# Patient Record
Sex: MALE | Race: WHITE | NOT HISPANIC OR LATINO | Employment: UNEMPLOYED | URBAN - METROPOLITAN AREA
[De-identification: names, ages, dates, MRNs, and addresses within clinical notes are randomized per-mention and may not be internally consistent; named-entity substitution may affect disease eponyms.]

---

## 2023-05-25 ENCOUNTER — HOSPITAL ENCOUNTER (OUTPATIENT)
Dept: DATA CONVERSION | Facility: HOSPITAL | Age: 73
End: 2023-05-25
Attending: SPECIALIST | Admitting: SPECIALIST
Payer: MEDICARE

## 2023-05-25 DIAGNOSIS — Z45.010 ENCOUNTER FOR CHECKING AND TESTING OF CARDIAC PACEMAKER PULSE GENERATOR (BATTERY): ICD-10-CM

## 2023-05-25 DIAGNOSIS — I44.2 ATRIOVENTRICULAR BLOCK, COMPLETE (MULTI): ICD-10-CM

## 2023-05-25 DIAGNOSIS — I44.1 ATRIOVENTRICULAR BLOCK, SECOND DEGREE: ICD-10-CM

## 2023-09-30 NOTE — H&P
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  He tells me he had his original pacemaker placed in 2006 for AV block.  His pacemaker was changed in 2012.  His last follow-up with Dr. Hernandez had been in 2019 and then  he missed some appointments due to his health.  He recently scheduled an appointment due to impending travel which revealed his pacemaker battery was extremely low and he needed it changed immediately, therefore he had work-up completed and was scheduled  for pacemaker change over today.  He has been n.p.o. since 8 PM last night.  He is not on anticoagulation.  He denies recent syncope, chest pain, fever or chills.  He has chronic dyspnea at exertion that is at baseline.  He denies rash or open sores.   He denies history of MRSA.     PMHX: HTN, HLD, CAD, GERD, prostate cancer with radiation, lung nodules under surveillance, OA, rotator cuff tear (right)  PSHX: PCI stent,PPMR 2006, 2012, left cataract surgery, cholecystectomy, ERCP, laminectomy, appendectomy, vasectomy  Social HX: Ex-smoker quit 1995, does not drink alcohol since 2018  FHX: CAD, prostate cancer      Comorbidities:   Comorbidites:  ·  Comorbid Conditions hypertension     Family History:   Cancer: yes    CAD: yes      Social History:   Social History:  Smoking Status former smoker    Alcohol Use denies              Allergies:  ·  No Known Allergies :     Medications Prior to Admission:   Admission Medication Reconciliation has not been completed for this patient.    Review of Systems:   Constitutional: NEGATIVE: Fever, Chills, Anorexia,  Malaise     Eyes: NEGATIVE: Blurry Vision, Diploplia, Vision  Loss/ Change     ENMT: NEGATIVE: Nasal Discharge, Nasal Congestion,   Throat Pain     Respiratory: NEGATIVE: Dry Cough, Productive Cough,  Wheezing, Shortness of Breath     Cardiac: POSITIVE: Dyspnea on Exertion; NEGATIVE:  Chest Pain, Palpitations, Syncope; COMMENTS: chronic FRANKEL     Gastrointestinal: NEGATIVE: Nausea, Vomiting, Diarrhea,  Constipation, Abdominal Pain     Genitourinary: NEGATIVE: Dysuria, Flank Pain, Hematuria     Musculoskeletal: NEGATIVE: Pain, Swelling, Weakness     Neurological: NEGATIVE: Dizziness, Headache, Syncope     Psychiatric: NEGATIVE: Anxiety     Skin: NEGATIVE: Rash, Ulcer       Objective:     Objective Information:    Temperature 98.0, heart rate 78, respiratory rate 12, pulse ox 98% room air, blood pressure 152/85      Physical Exam by System:    Constitutional: Well developed, awake/alert/oriented  x3, no distress, alert and cooperative   Eyes: PERRL, EOMI, clear sclera   ENMT: mucous membranes moist, no apparent injury,  no lesions seen   Head/Neck: Neck supple, no apparent injury, trachea  midline   Respiratory/Thorax: Patent airways, CTAB, normal  breath sounds with good chest expansion, thorax symmetric, able to speak in complete sentences.   Cardiovascular: RRR no murmur, +1 bilateral lower  extremity nonpitting edema, pulses palpable   Gastrointestinal: Nondistended, soft, non-tender,  no rebound tenderness or guarding, +BS,   Musculoskeletal: ROM intact, no joint swelling, normal  strength   Neurological: alert and oriented x3, speech clear,  smile symmetrical, moving all extremity, no elicited deficits   Psychological: Appropriate mood and behavior   Skin: Warm and dry, no lesions, no rashes, healed  scar left chest previous pacemaker insertion site, +1 bilateral lower extremity nonpitting edema     Medications:    Medications:          Continuous Medications       --------------------------------    1. Sodium Chloride 0.9% Infusion:  1000  mL  IntraVenous  <Continuous>         Scheduled Medications       --------------------------------  No  scheduled medications are active         PRN Medications       --------------------------------    1. 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  He tells me he had his original pacemaker placed in 2006 for AV block.  His pacemaker was changed in 2012.  His last follow-up with Dr. Hernandez had been in 2019 and then  he missed some appointments due to his health.  He recently scheduled an appointment due to impending travel which revealed his pacemaker battery was extremely low and he needed it changed immediately, therefore he had work-up completed and was scheduled  for pacemaker change over today.  He has been n.p.o. since 8 PM last night.  He is not on anticoagulation.  He denies recent syncope, chest pain, fever or chills.  He has chronic dyspnea at exertion that is at baseline.  He denies rash or open sores.   He denies history of MRSA.     Further clearance for procedure and orders deferred to Dr Hernandez.       Plan of Care Reviewed With:  Plan of Care Reviewed With: patient; son       Electronic Signatures:  Ana Harry (APRN-CNP)  (Signed 25-May-2023 13:25)   Authored: History of Present Illness, Comorbidities,  Family History, Social History, Allergies, Medications Prior to Admission, Review of Systems, Objective, Assessment and Plan, Note Completion      Last Updated: 25-May-2023 13:25 by Ana Harry (APRN-CNP)

## 2024-02-08 ENCOUNTER — EVALUATION (OUTPATIENT)
Dept: PHYSICAL THERAPY | Facility: CLINIC | Age: 74
End: 2024-02-08
Payer: MEDICARE

## 2024-02-08 DIAGNOSIS — Z96.641 PRESENCE OF RIGHT ARTIFICIAL HIP JOINT: ICD-10-CM

## 2024-02-08 DIAGNOSIS — M16.11 UNILATERAL PRIMARY OSTEOARTHRITIS, RIGHT HIP: ICD-10-CM

## 2024-02-08 PROCEDURE — 97110 THERAPEUTIC EXERCISES: CPT | Mod: GP | Performed by: PHYSICAL THERAPIST

## 2024-02-08 PROCEDURE — 97161 PT EVAL LOW COMPLEX 20 MIN: CPT | Mod: GP | Performed by: PHYSICAL THERAPIST

## 2024-02-08 PROCEDURE — 97535 SELF CARE MNGMENT TRAINING: CPT | Mod: GP | Performed by: PHYSICAL THERAPIST

## 2024-02-08 ASSESSMENT — PAIN - FUNCTIONAL ASSESSMENT: PAIN_FUNCTIONAL_ASSESSMENT: 0-10

## 2024-02-08 ASSESSMENT — PAIN SCALES - GENERAL: PAINLEVEL_OUTOF10: 0 - NO PAIN

## 2024-02-08 NOTE — LETTER
2024    Nam Patel MD  [address] [facility] [address]  [place] [address]    Patient: William Olsen   YOB: 1950   Date of Visit: 2024       Dear Nam Patel MD  [address]  [place] [address]    The attached plan of care is being sent to you because your patient’s medical reimbursement requires that you certify the plan of care. Your signature is required to allow uninterrupted insurance coverage.      You may indicate your approval by signing below and faxing this form back to us at Dept Fax: [phone].    Please call Dept: [phone] with any questions or concerns.    Thank you for this referral,        Laz Moseley, PT  [place] [address] [facility]  [address] [facility] [address]    Payer: Payor: MEDICARE / Plan: MEDICARE PART A AND B / Product Type: *No Product type* /                                                                         Date:     Dear Laz Moseley, PT,     Re: Mr. William Olsen, MRN:[id number]    I certify that I have reviewed the attached plan of care and it is medically necessary for Mr. William Olsen (1950) who is under my care.          ______________________________________                    _________________  Provider name and credentials                                           Date and time                                                                                           Plan of Care 24   Effective from: 2024  Effective to: 2024    Plan ID: [id number]            Participants as of Finalize on 2024    Name Type Comments Contact Info    Nam Patel MD Referring Provider  [phone]    Laz Moseley, PT Physical Therapist  [phone]       Last Plan Note     Author: Laz Moseley PT Status: Incomplete Last edited: 2024 10:45 AM       PT Initial Evaluation    Patient Name:  William Olsen    MRN:  [id number]    :   1950    Today's Date:  24    Time Calculation  Start Time: 1040  Stop Time: 1125  Time Calculation (min): 45 min    PT Evaluation Time Entry  PT Evaluation (Low) Time Entry: 15  PT Therapeutic Procedures Time Entry  Therapeutic Exercise Time Entry: 15  Self-Care/Home Mgmt Trainin     Informed Consent  Patient has been informed of all evaluation findings and treatment plans and agrees to participate in Physical Therapy services and plans as outlined.    Diagnosis:  Diagnosis and Precautions: R hip OA, s/p R ASH 2024 Dr. Nam Patel    Goals:   By the end of 10 visits patient will be able to do the following with < 1/10 R HIP pain:    HEP:  Patient will consistently perform HIS home exercise program for 20-30 minute sessions, 1-2x/day, 3-4 days/week independently by the end of 10 visits.    Basic ADL's:   Patient will perform bADL's/instrumental ADL's for 30 minutes moving between various closed kinetic chain postures.    ROM and Strength:  Patient will demonstrate 5/5 R HIP strength in all planes and WNL's R HIP AROM in all planes to improve their ability to lift, stand, ambulate and perform basic ADL's.    Stair Negotiation:  Patient will be able to ambulate up/down stairs for 1-2 flights at a time.    Gait/Locomotion:  Patient will be able to ambulate for 30-60 minutes at a time.  Minimal to no gait deviation across level ground/stairs.  Patient will demonstrate no R HIP pain with the following movements:  partial squat, lunge, forward/lateral step-up, HR, stepdown and SLS.    Sleep:  Patient will sleep thru the night 4/7 nights/week.    Participation restrictions:  Increase LEFS to > or = to 55/80 for increased functional ability.    Pain:  Decrease pain at worst to < or = to 1/10 for improved QOL and ability to sleep.    No point tenderness noted over the R hip.     Plan of Care:      Treatment/Interventions: Cryotherapy, Education/ Instruction, Gait training, Manual therapy, Neuromuscular  re-education, Self care/ home management, Taping techniques, Therapeutic activities, Therapeutic exercises  PT Plan: Skilled PT  PT Frequency: Other (Comment) (1-2x/week)  Duration: 10 visits  Onset Date: 01/20/24  Certification Period Start Date: 02/08/24  Certification Period End Date: 05/08/24  Number of Treatments Authorized: 10  Rehab Potential: Good  Plan of Care Agreement: Patient    PT Assessment:    Patient is a 73 y.o. MALE with c/o R hip pain.   Patient is alert and oriented x 3.  Patient presents with medical diagnosis of R hip OA, ~ 3 weeks s/p R ASH 1/20/2024 Dr. Nam Patel  contributing to compensatory soft tissue dysfunction, pain, stiffness and weakness of the R hip.   Significant past medical history/past surgical history includes see above.    Skilled care is needed to progress the patient back to these activities without exacerbating symptoms.   Patient requires skilled PT services to address the problems identified and the individualized patient's goals as outlined in the problems and goals section of this evaluation.  A skilled PT is required to address these key impairments and to provide and progress with an appropriate home exercise program. Patient does have any significant PMH influencing Rx and reports motivation to return to FUNCTIONAL ACTIVITY.   Patient demonstrates to be a good candidate for physical therapy with good rehab potential and verbalized a good understanding of HIS diagnosis, prognosis and treatment.  Goals have been established and reviewed with the patient.      PT Assessment Results: Decreased strength, Decreased range of motion, Decreased endurance, Impaired balance, Decreased mobility, Orthopedic restrictions  Rehab Prognosis: Good  Evaluation/Treatment Tolerance: Patient tolerated treatment well    Complexity:  Low complexity evaluation  due to a 15 minute duration, a past medical history WITH any personal factors and/or comorbidities that could impact the POC,  examination of body systems completed on one to two elements, the patient presents with a stable condition, and clinical decision making using the LEFS was of low complexity.     Prognosis:  Rehab Prognosis: Good    Problem List  Activity Limitations, Decreased Functional Level, Decreased knowledge of HEP, Flexibility, Gait issues, Pain, Range of Motion/joint mobility issues, Strength, and Endurance    Impairments   IMPAIRED ROM LOWER BODY, IMPAIRED STRENGTH LOWER BODY, IMPAIRED GAIT, IMPAIRED BALANCE, IMPAIRED CORE STABILITY, INCREASED PAIN, IMPAIRED FUNCTIONAL ACTIVITY LEVEL, and IMPAIRED FUNCTIONAL MOVEMENT PATTERNS    Functional Limitations:  LIMITATIONS PERFORMING BASIC ADL'S, ISSUES WITH SLEEP, PARTICIPATION IN HOBBIES, PARTICIPATION IN LEISURE ACTIVITIES, and PARTICIPATION IN HOME MANAGEMENT    General Visit Information:  Reason for Referral: Evaluate and Treat  Referred By: Dr. Nam Patel  General Comment: R hip OA, s/p R ASH 1/20/2024 Dr. Nam Patel    Pre-Cautions:  GLOADI Fall Risk Score (The score of 4 or more indicates an increased risk of falling): 0  LE Weight Bearing Status: Weight Bearing as Tolerated  Medical Precautions:  (pacemaker, history of stents, heart surgery, back surgery, R hand surgery, gallbladder surgery, heart disease, HTN, prostate cancer and surgery, angina, sleep apnea, L ankle surgery, see attached sheets))  Post-Surgical Precautions: Right hip precautions (posterolateral approach)    Protocol:  Right hip precautions     Reason for Visit:  PT Evaluate and Treat    Initial Evaluation:  Referred By: Dr. Nam Patel    Insurance  Insurance reviewed  Name of Insurance:  Medicare  Visit No.  1  * (EVAL) RT HIP ; MEDICARE PART B 20% COINSUR 240 DED UNLIM V BMN  PA IS NOT REQ; AARP IS SEC TO MEDICARE AND WILL COV PART B COINSUR AFTER DED IS MET [id number] // Isabelle confirmed 2/1/24 10:49am     Subjective:    Current Episode  Date of Onset:  Chronic R hip pain for 2 years before  surgery  Mechanism of injury:  Arthritic changes of the R hip over the years, no specific mechanism of injury to the R hip.    Pain Score:  Pain Assessment: 0-10  Pain Assessment  Pain Assessment: 0-10  Pain Score: 0 - No pain  Pain Type: Surgical pain  Pain Location: Hip  Pain Orientation: Right  Pain Type: Surgical pain  Pain Location: Hip  Pain Orientation: Right     Better with:  meds, walking    Worse with:  prolonged sitting, stairs, doing laundry, using the bird feeder    Medical History/Surgical History:  Medical Precautions:  (pacemaker, history of stents, heart surgery, back surgery, R hand surgery, gallbladder surgery, heart disease, HTN, prostate cancer and surgery, angina, sleep apnea, L ankle surgery, see attached sheets)    Reviewed medical history form with patient (medications/allergies reviewed with patient).  No current outpatient medications    Functional Assessment:  Level of Luce:  Level of Luce: Independent with ADLs and functional transfers    Functional Limitations:  prolonged sitting, stairs, doing laundry, using the bird feeder    Work Status:  RETIRED  Patient Awareness:  Patient is aware of HIS diagnosis and prognosis.  Social Support/History:  LIVES ALONE    Objective:  Restrictions as per MD's Protocol if surgical:  Evaluate and Treat    Weightbearing Status:  WBAT R LE with cane    Any Pre-Cautions:  posterolateral hip pre-cautions    Skin:  R hip surgical incision healing and closed, no active sign of infection    Palpation:   no point tenderness over R hip    Sensation:  Patient denies numbness/tingling of bilateral LOWER extremities.    Gait:  mild antalgic gait R LE with cane assist    ROM:  AROM  R hip flexion 80 degrees, ER 15 degrees, abduction end range tightness  R knee AROM WNL's, L knee AROM WNL's, R ankle/foot AROM WNL's, and L ankle/foot AROM WNL's    Strength:    R hip flexion 3+/5, R hip abduction 3+/5, R knee 4/5 all planes  L knee 5/5 all planes, L hip  5/5 all planes, R ankle/foot 5/5 all planes, and L ankle/foot 5/5 all planes    Outcome measure  Lower Extremity Funtional Score (LEFS): 28/80      Treatment  Time in clinic started at  10:40am  Time in clinic ended at  11:25am  Total time in clinic is . 45 minutes  Total timed code time is  38 minutes    Treatment Performed Today:.   PT Initial Evaluation, Therapeutic Exercise, and Self-Care/Home Management HEP  Individual(s) Educated: Patient  Education Provided: Home Exercise Program  Diagnosis and Precautions: R hip OA, s/p R ASH 1/20/2024 Dr. Nam Patel  Risk and Benefits Discussed with Patient/Caregiver/Other: yes  Patient/Caregiver Demonstrated Understanding: yes  Plan of Care Discussed and Agreed Upon: yes  Patient Response to Education: Patient/Caregiver Verbalized Understanding of Information, Patient/Caregiver Performed Return Demonstration of Exercises/Activities    Patient instructed in a home exercise program, has been given handouts for each of the exercises performed and was given another sheet instructing patient in the amount of reps to perform and the dariana to follow while doing the exercises     Access Code: [email or web address]  URL: [email or web address]  Date: 02/08/2024  Prepared by: Laz Moseley    Exercises  - Supine Bridge  - 1 x daily - 3-4 x weekly - 2 sets - 10 reps - 5-10 hold  - Supine Straight Leg Raise  - 1 x daily - 3-4 x weekly - 2 sets - 10 reps - 5-10 hold  - Standing March with Counter Support  - 1 x daily - 3-4 x weekly - 2 sets - 10 reps - 5-10 hold  - Mini Squat with Counter Support  - 1 x daily - 3-4 x weekly - 2 sets - 10 reps - 5-10 hold         Current Participants as of 2/8/2024    Name Type Comments Contact Info    Nam Patel MD Referring Provider  [phone]    Signature pending    Laz Moseley PT Physical Therapist  [phone]           Abdominal Pain, N/V/D n/a

## 2024-02-08 NOTE — PROGRESS NOTES
PT Initial Evaluation    Patient Name:  William Olsen    MRN:  [id number]    :  1950    Today's Date:  24    Time Calculation  Start Time: 1040  Stop Time: 1125  Time Calculation (min): 45 min    PT Evaluation Time Entry  PT Evaluation (Low) Time Entry: 15  PT Therapeutic Procedures Time Entry  Therapeutic Exercise Time Entry: 15  Self-Care/Home Mgmt Trainin     Informed Consent  Patient has been informed of all evaluation findings and treatment plans and agrees to participate in Physical Therapy services and plans as outlined.    Diagnosis:  Diagnosis and Precautions: R hip OA, s/p R ASH 2023 Dr. Nam Patel    Goals:   By the end of 10 visits patient will be able to do the following with < 1/10 R HIP pain:    HEP:  Patient will consistently perform HIS home exercise program for 20-30 minute sessions, 1-2x/day, 3-4 days/week independently by the end of 10 visits.    Basic ADL's:   Patient will perform bADL's/instrumental ADL's for 30 minutes moving between various closed kinetic chain postures.    ROM and Strength:  Patient will demonstrate 5/5 R HIP strength in all planes and WNL's R HIP AROM in all planes to improve their ability to lift, stand, ambulate and perform basic ADL's.    Stair Negotiation:  Patient will be able to ambulate up/down stairs for 1-2 flights at a time.    Gait/Locomotion:  Patient will be able to ambulate for 30-60 minutes at a time.  Minimal to no gait deviation across level ground/stairs.  Patient will demonstrate no R HIP pain with the following movements:  partial squat, lunge, forward/lateral step-up, HR, stepdown and SLS.    Sleep:  Patient will sleep thru the night 4/7 nights/week.    Participation restrictions:  Increase LEFS to > or = to 55/80 for increased functional ability.    Pain:  Decrease pain at worst to < or = to 1/10 for improved QOL and ability to sleep.    No point tenderness noted over the R hip.     Plan of Care:      Treatment/Interventions:  Cryotherapy, Education/ Instruction, Gait training, Manual therapy, Neuromuscular re-education, Self care/ home management, Taping techniques, Therapeutic activities, Therapeutic exercises  PT Plan: Skilled PT  PT Frequency: Other (Comment) (1-2x/week)  Duration: 10 visits  Onset Date: 12/20/23  Certification Period Start Date: 02/08/24  Certification Period End Date: 05/08/24  Number of Treatments Authorized: 10  Rehab Potential: Good  Plan of Care Agreement: Patient    PT Assessment:    Patient is a 73 y.o. MALE with c/o R hip pain.   Patient is alert and oriented x 3.  Patient presents with medical diagnosis of R hip OA, s/p R ASH 12/20/2023 Dr. Nam Patel  contributing to compensatory soft tissue dysfunction, pain, stiffness and weakness of the R hip.   Significant past medical history/past surgical history includes see above.    Skilled care is needed to progress the patient back to these activities without exacerbating symptoms.   Patient requires skilled PT services to address the problems identified and the individualized patient's goals as outlined in the problems and goals section of this evaluation.  A skilled PT is required to address these key impairments and to provide and progress with an appropriate home exercise program. Patient does have any significant PMH influencing Rx and reports motivation to return to FUNCTIONAL ACTIVITY.   Patient demonstrates to be a good candidate for physical therapy with good rehab potential and verbalized a good understanding of HIS diagnosis, prognosis and treatment.  Goals have been established and reviewed with the patient.      PT Assessment Results: Decreased strength, Decreased range of motion, Decreased endurance, Impaired balance, Decreased mobility, Orthopedic restrictions  Rehab Prognosis: Good  Evaluation/Treatment Tolerance: Patient tolerated treatment well    Complexity:  Low complexity evaluation  due to a 15 minute duration, a past medical history WITH  any personal factors and/or comorbidities that could impact the POC, examination of body systems completed on one to two elements, the patient presents with a stable condition, and clinical decision making using the LEFS was of low complexity.     Prognosis:  Rehab Prognosis: Good    Problem List  Activity Limitations, Decreased Functional Level, Decreased knowledge of HEP, Flexibility, Gait issues, Pain, Range of Motion/joint mobility issues, Strength, and Endurance    Impairments   IMPAIRED ROM LOWER BODY, IMPAIRED STRENGTH LOWER BODY, IMPAIRED GAIT, IMPAIRED BALANCE, IMPAIRED CORE STABILITY, INCREASED PAIN, IMPAIRED FUNCTIONAL ACTIVITY LEVEL, and IMPAIRED FUNCTIONAL MOVEMENT PATTERNS    Functional Limitations:  LIMITATIONS PERFORMING BASIC ADL'S, ISSUES WITH SLEEP, PARTICIPATION IN HOBBIES, PARTICIPATION IN LEISURE ACTIVITIES, and PARTICIPATION IN HOME MANAGEMENT    General Visit Information:  Reason for Referral: Evaluate and Treat  Referred By: Dr. Nam Patel  General Comment: R hip OA, s/p R ASH 12/20/2023 Dr. Nam Patel    Pre-Cautions:  STEADI Fall Risk Score (The score of 4 or more indicates an increased risk of falling): 0  LE Weight Bearing Status: Weight Bearing as Tolerated  Medical Precautions:  (pacemaker, history of stents, heart surgery, back surgery, R hand surgery, gallbladder surgery, heart disease, HTN, prostate cancer and surgery, angina, sleep apnea, L ankle surgery, see attached sheets))  Post-Surgical Precautions: Right hip precautions (posterolateral approach)    Protocol:  Right hip precautions     Reason for Visit:  PT Evaluate and Treat    Initial Evaluation:  Referred By: Dr. Nam Patel    Insurance  Insurance reviewed  Name of Insurance:  Medicare  Visit No.  1  * (EVAL) RT HIP ; MEDICARE PART B 20% COINSUR 240 DED UNLIM V BMN  PA IS NOT REQ; AARP IS SEC TO MEDICARE AND WILL COV PART B COINSUR AFTER DED IS MET [id number] // Isabelle confirmed 2/1/24 10:49am      Subjective:    Current Episode  Date of Onset:  Chronic R hip pain for 2 years before surgery  Mechanism of injury:  Arthritic changes of the R hip over the years, no specific mechanism of injury to the R hip.    Pain Score:  Pain Assessment: 0-10  Pain Assessment  Pain Assessment: 0-10  Pain Score: 0 - No pain  Pain Type: Surgical pain  Pain Location: Hip  Pain Orientation: Right  Pain Type: Surgical pain  Pain Location: Hip  Pain Orientation: Right     Better with:  meds, walking    Worse with:  prolonged sitting, stairs, doing laundry, using the bird feeder    Medical History/Surgical History:  Medical Precautions:  (pacemaker, history of stents, heart surgery, back surgery, R hand surgery, gallbladder surgery, heart disease, HTN, prostate cancer and surgery, angina, sleep apnea, L ankle surgery, see attached sheets)    Reviewed medical history form with patient (medications/allergies reviewed with patient).  No current outpatient medications    Functional Assessment:  Level of [place]:  Level of [place]: Independent with ADLs and functional transfers    Functional Limitations:  prolonged sitting, stairs, doing laundry, using the bird feeder    Work Status:  RETIRED  Patient Awareness:  Patient is aware of HIS diagnosis and prognosis.  Social Support/History:  LIVES ALONE    Objective:  Restrictions as per MD's Protocol if surgical:  Evaluate and Treat    Weightbearing Status:  WBAT R LE with cane    Any Pre-Cautions:  posterolateral hip pre-cautions    Skin:  R hip surgical incision healing and closed, no active sign of infection    Palpation:   no point tenderness over R hip    Sensation:  Patient denies numbness/tingling of bilateral LOWER extremities.    Gait:  mild antalgic gait R LE with cane assist    ROM:  AROM  R hip flexion 80 degrees, ER 15 degrees, abduction end range tightness  R knee AROM WNL's, L knee AROM WNL's, R ankle/foot AROM WNL's, and L ankle/foot AROM WNL's    Strength:    R  hip flexion 3+/5, R hip abduction 3+/5, R knee 4/5 all planes  L knee 5/5 all planes, L hip 5/5 all planes, R ankle/foot 5/5 all planes, and L ankle/foot 5/5 all planes    Outcome measure  Lower Extremity Funtional Score (LEFS): 28/80      Treatment  Time in clinic started at  10:40am  Time in clinic ended at  11:25am  Total time in clinic is . 45 minutes  Total timed code time is  38 minutes    Treatment Performed Today:.   PT Initial Evaluation, Therapeutic Exercise, and Self-Care/Home Management HEP  Individual(s) Educated: Patient  Education Provided: Home Exercise Program  Diagnosis and Precautions: R hip OA, s/p R ASH 12/20/2023 Dr. Nam Patel  Risk and Benefits Discussed with Patient/Caregiver/Other: yes  Patient/Caregiver Demonstrated Understanding: yes  Plan of Care Discussed and Agreed Upon: yes  Patient Response to Education: Patient/Caregiver Verbalized Understanding of Information, Patient/Caregiver Performed Return Demonstration of Exercises/Activities    Patient instructed in a home exercise program, has been given handouts for each of the exercises performed and was given another sheet instructing patient in the amount of reps to perform and the dariana to follow while doing the exercises     Access Code: [email or web address]  URL: [email or web address]  Date: 02/08/2024  Prepared by: Laz Moseley    Exercises  - Supine Bridge  - 1 x daily - 3-4 x weekly - 2 sets - 10 reps - 5-10 hold  - Supine Straight Leg Raise  - 1 x daily - 3-4 x weekly - 2 sets - 10 reps - 5-10 hold  - Standing March with Counter Support  - 1 x daily - 3-4 x weekly - 2 sets - 10 reps - 5-10 hold  - Mini Squat with Counter Support  - 1 x daily - 3-4 x weekly - 2 sets - 10 reps - 5-10 hold

## 2024-02-08 NOTE — PATIENT INSTRUCTIONS
Access Code: [email or web address]  URL: [email or web address]  Date: 02/08/2024  Prepared by: Laz Moseley    Exercises  - Supine Bridge  - 1 x daily - 3-4 x weekly - 2 sets - 10 reps - 5-10 hold  - Supine Straight Leg Raise  - 1 x daily - 3-4 x weekly - 2 sets - 10 reps - 5-10 hold  - Standing March with Counter Support  - 1 x daily - 3-4 x weekly - 2 sets - 10 reps - 5-10 hold  - Mini Squat with Counter Support  - 1 x daily - 3-4 x weekly - 2 sets - 10 reps - 5-10 hold

## 2024-02-15 ENCOUNTER — TREATMENT (OUTPATIENT)
Dept: PHYSICAL THERAPY | Facility: CLINIC | Age: 74
End: 2024-02-15
Payer: MEDICARE

## 2024-02-15 DIAGNOSIS — M16.11 PRIMARY OSTEOARTHRITIS OF RIGHT HIP: Primary | ICD-10-CM

## 2024-02-15 DIAGNOSIS — M16.11 UNILATERAL PRIMARY OSTEOARTHRITIS, RIGHT HIP: ICD-10-CM

## 2024-02-15 PROCEDURE — 97110 THERAPEUTIC EXERCISES: CPT | Mod: GP | Performed by: PHYSICAL THERAPIST

## 2024-02-15 ASSESSMENT — PAIN SCALES - GENERAL: PAINLEVEL_OUTOF10: 0 - NO PAIN

## 2024-02-15 ASSESSMENT — PAIN - FUNCTIONAL ASSESSMENT: PAIN_FUNCTIONAL_ASSESSMENT: 0-10

## 2024-02-15 NOTE — PROGRESS NOTES
PHYSICAL THERAPY TREATMENT    Patient name:  William Olsen    MRN:  [id number]    :  1950    Today's Date:  24    Time Calculation  Start Time: 831  Stop Time: 913  Time Calculation (min): 42 min     PT Therapeutic Procedures Time Entry  Therapeutic Exercise Time Entry: 38     Referral by:  Referred By: Dr. Nam Patel    Diagnoses:  Diagnosis and Precautions: R hip OA, s/p R ASH 2023 Dr. Nam Patel    Assessment/Plan:  Therapeutic exercise performed in order to improve R hip strength/ROM/mobility.  Patient requires increased tactile/verbal cues with exercise in order to reduce R hip stress/strain during the particular exercise performed.  Patient challenged with exercises performed in clinic secondary to R hip fatigue.   PT Assessment  PT Assessment Results: Decreased strength, Decreased range of motion, Decreased endurance, Impaired balance, Decreased mobility, Orthopedic restrictions  Rehab Prognosis: Good  Evaluation/Treatment Tolerance: Patient tolerated treatment well  OP PT Plan  PT Plan: Skilled PT  Rehab Potential: Good  Plan of Care Agreement: Patient    General Visit Information  PT  Visit  PT Received On: 02/15/24    General  Reason for Referral: Evaluate and Treat  Referred By: Dr. Nam Patel  General Comment: R hip OA, s/p R ASH 2023 Dr. Nam Patel    Insurance Reviewed  Name of insurance:  Medicare  Visit #:   2  RT HIP ; MEDICARE PART B 20% COINSUR 240 DED UNLIM V BMN // Isabelle confirmed 24 9:19am  PA IS NOT REQ; AARP IS SEC TO MEDICARE AND WILL COV PART B COINSUR AFTER DED IS MET [id number]     Subjective:  Patient reports that his R hip continues to feel better.    Precautions  STEADI Fall Risk Score (The score of 4 or more indicates an increased risk of falling): 0  LE Weight Bearing Status: Weight Bearing as Tolerated  Medical Precautions:  (pacemaker, history of stents, heart surgery, back surgery, R hand surgery, gallbladder surgery, heart disease, HTN,  "prostate cancer and surgery, angina, sleep apnea, L ankle surgery, see attached sheets)  Post-Surgical Precautions: Right hip precautions (posterolateral approach)    Pain:  Pain Assessment  Pain Assessment: 0-10  Pain Score: 0 - No pain  Pain Type: Surgical pain  Pain Location: Hip  Pain Orientation: Right    Treatments    Therapeutic Exercise  Therapeutic Exercise Performed: Yes  Therapeutic Exercise Activity 1: Recumbent bike x 8 minues, resistance 4.0  Therapeutic Exercise Activity 2: Standing R hip flexor stretch at steps, 30\"x5  Therapeutic Exercise Activity 3: Standing marching with TB red, 2 sets, 10 reps  Therapeutic Exercise Activity 4: Mini-Squats with TB around knees, blue TB, 3 sets, 10 reps  Therapeutic Exercise Activity 5: Forward Step-Ups 6 inch step, x 30 reps  Therapeutic Exercise Activity 6: Lateral Step-Ups 6 inch step, x 30 reps  Therapeutic Exercise Activity 7: Hip PRE's hip flexion, hip extension, hip abduction x 30 reps each R and L  Therapeutic Exercise Activity 8: supine bridge with hip abduction with blue TB, 2 sets, 10 reps  Therapeutic Exercise Activity 9: side lying clamshells blue TB x 20 reps    Treatment  Time in clinic started at:   8:31am  Time in clinic ended at:   9:13am  Total time in clinic is:   42 minutes  Total timed code time is:  38 minutes    Treatment Performed Today:.   Therapeutic Exercise x 3 units  "

## 2024-02-27 ENCOUNTER — TREATMENT (OUTPATIENT)
Dept: PHYSICAL THERAPY | Facility: CLINIC | Age: 74
End: 2024-02-27
Payer: MEDICARE

## 2024-02-27 DIAGNOSIS — M16.11 UNILATERAL PRIMARY OSTEOARTHRITIS, RIGHT HIP: ICD-10-CM

## 2024-02-27 PROCEDURE — 97110 THERAPEUTIC EXERCISES: CPT | Mod: GP,CQ

## 2024-02-27 ASSESSMENT — PAIN - FUNCTIONAL ASSESSMENT: PAIN_FUNCTIONAL_ASSESSMENT: 0-10

## 2024-02-27 ASSESSMENT — PAIN SCALES - GENERAL: PAINLEVEL_OUTOF10: 0 - NO PAIN

## 2024-02-27 NOTE — PROGRESS NOTES
PHYSICAL THERAPY TREATMENT    Patient name:  William Olsen    MRN:  [id number]    :  1950    Today's Date:  24    Time Calculation  Start Time: 1015  Stop Time: 1100  Time Calculation (min): 45 min     PT Therapeutic Procedures Time Entry  Therapeutic Exercise Time Entry: 45     Referral by:  Referred By: Dr. Nam Patel    Diagnoses:  Diagnosis and Precautions: R hip OA, s/p R ASH 2023 Dr. Nam Patel    Assessment/Plan:  Therapeutic exercise performed in order to improve R hip strength/ROM/mobility.  Patient requires increased tactile/verbal cues with exercise in order to reduce R hip stress/strain during the particular exercise performed.  Patient challenged with exercises performed in clinic secondary to R hip fatigue. No complaints of increased pain during or after treatment.   PT Assessment  PT Assessment Results: Decreased strength, Decreased range of motion, Decreased endurance, Impaired balance, Decreased mobility, Orthopedic restrictions  OP PT Plan  PT Plan: Skilled PT    General Visit Information       General  Referred By: Dr. Nam Patel  General Comment: R hip OA, s/p R ASH 2023 Dr. Nam Patel    Insurance Reviewed  Name of insurance:  Medicare  Visit #:   3  RT HIP ; MEDICARE PART B 20% COINSUR 240 DED UNLIM V BMN // Isabelle confirmed 24 9:19am  PA IS NOT REQ; AARP IS SEC TO MEDICARE AND WILL COV PART B COINSUR AFTER DED IS MET [id number]     Subjective:    Pt states that his right hip has been feeling really good. He denies pain or stiffness and states that he has been getting around his house a lot better lately. He is performing his HEP consistently every day.   Precautions  STEADI Fall Risk Score (The score of 4 or more indicates an increased risk of falling): 0  LE Weight Bearing Status: Weight Bearing as Tolerated  Medical Precautions:  (pacemaker, history of stents, heart surgery, back surgery, R hand surgery, gallbladder surgery, heart disease, HTN,  "prostate cancer and surgery, angina, sleep apnea, L ankle surgery, see attached sheets)  Post-Surgical Precautions: Right hip precautions (posterolateral approach)    Pain:  Pain Assessment  Pain Assessment: 0-10  Pain Score: 0 - No pain  Pain Location: Hip  Pain Orientation: Right    Treatments  Recumbent bike x 8 minues, resistance 4.0  Standing R hip flexor stretch at steps, 30\"x 4  Standing marching with TB red, 2 sets, 10 reps NT  Mini-Squats with TB around knees, blue TB, 3 sets, 10 reps  Forward Step-Ups 6 inch step, x 15 reps  Lateral Step-Ups 6 inch step, x 15 rep  Hip PRE's hip flexion, hip extension, hip abduction x 20 reps each R and L  supine bridge x 10 reps  side lying clamshells blue TB x 20 reps NT  Side stepping in hallway 20 ft x2  Dynamic march in hallway (< 90 degrees) 20 ft x2       Treatment  Time in clinic started at:  10:15 am  Time in clinic ended at:  11:00 am  Total time in clinic is: 45 minutes  Total timed code time is: 45 minutes    Treatment Performed Today:.   Therapeutic Exercise x 3 units  "

## 2024-03-01 ENCOUNTER — TREATMENT (OUTPATIENT)
Dept: PHYSICAL THERAPY | Facility: CLINIC | Age: 74
End: 2024-03-01
Payer: MEDICARE

## 2024-03-01 DIAGNOSIS — M16.11 PRIMARY OSTEOARTHRITIS OF RIGHT HIP: Primary | ICD-10-CM

## 2024-03-01 DIAGNOSIS — M16.11 UNILATERAL PRIMARY OSTEOARTHRITIS, RIGHT HIP: ICD-10-CM

## 2024-03-01 PROCEDURE — 97110 THERAPEUTIC EXERCISES: CPT | Mod: GP | Performed by: PHYSICAL THERAPIST

## 2024-03-01 ASSESSMENT — PAIN - FUNCTIONAL ASSESSMENT: PAIN_FUNCTIONAL_ASSESSMENT: 0-10

## 2024-03-01 ASSESSMENT — PAIN SCALES - GENERAL: PAINLEVEL_OUTOF10: 0 - NO PAIN

## 2024-03-01 NOTE — PROGRESS NOTES
PHYSICAL THERAPY TREATMENT    Patient name:  William Olsen    MRN:  [id number]    :  1950    Today's Date:  24    Time Calculation  Start Time: 1000  Stop Time: 1042  Time Calculation (min): 42 min     PT Therapeutic Procedures Time Entry  Therapeutic Exercise Time Entry: 38     Referral by:  Referred By: Dr. Nam Patel    Diagnoses:  Diagnosis and Precautions: R hip OA, s/p R ASH 2023 Dr. Nam Patel    Assessment/Plan:  Therapeutic exercise performed in order to improve R hip strength/ROM/mobility.  Patient requires increased tactile/verbal cues with exercise in order to reduce R hip stress/strain during the particular exercise performed.  Patient challenged with exercises performed in clinic secondary to R hip fatigue.   PT Assessment  PT Assessment Results: Decreased strength, Decreased range of motion, Decreased endurance, Impaired balance, Decreased mobility, Orthopedic restrictions  Rehab Prognosis: Good  Evaluation/Treatment Tolerance: Patient tolerated treatment well  OP PT Plan  PT Plan: Skilled PT  Rehab Potential: Good  Plan of Care Agreement: Patient    General Visit Information  PT  Visit  PT Received On: 24    General  Reason for Referral: Evaluate and Treat  Referred By: Dr. Nam Patel  General Comment: R hip OA, s/p R ASH 2023 Dr. Nam Patel    Insurance Reviewed  Name of insurance:  Medicare  Visit #:   4  RT HIP ; MEDICARE PART B 20% COINSUR 240 DED UNLIM V BMN // Isabelle confirmed 24 9:19am  PA IS NOT REQ; AARP IS SEC TO MEDICARE AND WILL COV PART B COINSUR AFTER DED IS MET [id number]     Subjective:  Patient reports that his R hip is doing better.    Precautions  STEADI Fall Risk Score (The score of 4 or more indicates an increased risk of falling): 0  LE Weight Bearing Status: Weight Bearing as Tolerated  Medical Precautions:  (pacemaker, history of stents, heart surgery, back surgery, R hand surgery, gallbladder surgery, heart disease, HTN, prostate  "cancer and surgery, angina, sleep apnea, L ankle surgery, see attached sheets)  Post-Surgical Precautions: Right hip precautions (posterolateral approach)    Pain:  Pain Assessment  Pain Assessment: 0-10  Pain Score: 0 - No pain  Pain Location: Hip  Pain Orientation: Right    Treatments    Therapeutic Exercise  Therapeutic Exercise Performed: Yes  Therapeutic Exercise Activity 1: Recumbent bike x 8 minues, resistance 4.0  Therapeutic Exercise Activity 2: Standing R hip flexor stretch at steps, 30\"x5  Therapeutic Exercise Activity 3: Standing marching with TB red, 2 sets, 10 reps  Therapeutic Exercise Activity 4: Mini-Squats with TB around knees, blue TB, 3 sets, 10 reps  Therapeutic Exercise Activity 5: Forward Step-Ups 6 inch step, x 30 reps  Therapeutic Exercise Activity 6: Lateral Step-Ups 6 inch step, x 30 reps  Therapeutic Exercise Activity 7: Hip PRE's hip flexion, hip extension, hip abduction x 30 reps each R and L  Therapeutic Exercise Activity 8: TB sidestep yellow x 3 laps    Treatment  Time in clinic started at:   10am  Time in clinic ended at:   10:42am  Total time in clinic is:   42 minutes  Total timed code time is:  38 minutes    Treatment Performed Today:.   Therapeutic Exercise x 3 units  "

## 2024-03-05 ENCOUNTER — TREATMENT (OUTPATIENT)
Dept: PHYSICAL THERAPY | Facility: CLINIC | Age: 74
End: 2024-03-05
Payer: MEDICARE

## 2024-03-05 DIAGNOSIS — M16.11 UNILATERAL PRIMARY OSTEOARTHRITIS, RIGHT HIP: ICD-10-CM

## 2024-03-05 DIAGNOSIS — M16.11 PRIMARY OSTEOARTHRITIS OF RIGHT HIP: Primary | ICD-10-CM

## 2024-03-05 PROCEDURE — 97110 THERAPEUTIC EXERCISES: CPT | Mod: GP | Performed by: PHYSICAL THERAPIST

## 2024-03-05 ASSESSMENT — PAIN SCALES - GENERAL: PAINLEVEL_OUTOF10: 0 - NO PAIN

## 2024-03-05 ASSESSMENT — PAIN - FUNCTIONAL ASSESSMENT: PAIN_FUNCTIONAL_ASSESSMENT: 0-10

## 2024-03-05 NOTE — PROGRESS NOTES
PHYSICAL THERAPY TREATMENT    Patient name:  William Olsen    MRN:  [id number]    :  1950    Today's Date:  24    Time Calculation  Start Time: 1530  Stop Time: 161  Time Calculation (min): 41 min     PT Therapeutic Procedures Time Entry  Therapeutic Exercise Time Entry: 38     Referral by:  Referred By: Dr. Nam Patel    Diagnoses:  Diagnosis and Precautions: R hip OA, s/p R ASH 2023 Dr. Nam Patel    Assessment/Plan:  Therapeutic exercise performed in order to improve R hip strength/ROM/mobility.  Patient requires increased tactile/verbal cues with exercise in order to reduce R hip stress/strain during the particular exercise performed.  Patient challenged with exercises performed in clinic secondary to R hip fatigue.   PT Assessment  PT Assessment Results: Decreased strength, Decreased range of motion, Decreased endurance, Impaired balance, Decreased mobility, Orthopedic restrictions  Rehab Prognosis: Good  Evaluation/Treatment Tolerance: Patient tolerated treatment well  OP PT Plan  PT Plan: Skilled PT  Rehab Potential: Good  Plan of Care Agreement: Patient    General Visit Information  PT  Visit  PT Received On: 24    General  Reason for Referral: Evaluate and Treat  Referred By: Dr. Nam Patel  General Comment: R hip OA, s/p R ASH 2023 Dr. Nam Patel    Insurance Reviewed  Name of insurance:  Medicare  Visit #:   5  RT HIP ; MEDICARE PART B 20% COINSUR 240 DED UNLIM V BMN // Isabelle confirmed 24 9:19am  PA IS NOT REQ; AARP IS SEC TO MEDICARE AND WILL COV PART B COINSUR AFTER DED IS MET [id number]     Subjective:  Patient reports that his R hip is feeling pretty good.    Precautions  STEADI Fall Risk Score (The score of 4 or more indicates an increased risk of falling): 0  LE Weight Bearing Status: Weight Bearing as Tolerated  Medical Precautions:  (pacemaker, history of stents, heart surgery, back surgery, R hand surgery, gallbladder surgery, heart disease, HTN,  "prostate cancer and surgery, angina, sleep apnea, L ankle surgery, see attached sheets)  Post-Surgical Precautions: Right hip precautions (posterolateral approach)    Pain:  Pain Assessment  Pain Assessment: 0-10  Pain Score: 0 - No pain  Pain Location: Hip  Pain Orientation: Right    Treatments    Therapeutic Exercise  Therapeutic Exercise Performed: Yes  Therapeutic Exercise Activity 1: Recumbent bike x 8 minues, resistance 4.0  Therapeutic Exercise Activity 2: Standing R hip flexor stretch at steps, 30\"x5  Therapeutic Exercise Activity 3: Standing marching with TB red, 2 sets, 10 reps  Therapeutic Exercise Activity 4: Mini-Squats with TB around knees, blue TB, 3 sets, 10 reps  Therapeutic Exercise Activity 5: Forward Step-Ups 6 inch step, x 30 reps  Therapeutic Exercise Activity 6: Lateral Step-Ups 6 inch step, x 30 reps  Therapeutic Exercise Activity 7: Hip PRE's hip flexion, hip extension, hip abduction x 30 reps each R and L  Therapeutic Exercise Activity 8: TB sidestep red x 2 laps    Treatment  Time in clinic started at:   3:30pm  Time in clinic ended at:   4:11pm  Total time in clinic is:    41 minutes  Total timed code time is:  38 minutes    Treatment Performed Today:.   Therapeutic Exercise x 3 units  "

## 2024-03-12 ENCOUNTER — TREATMENT (OUTPATIENT)
Dept: PHYSICAL THERAPY | Facility: CLINIC | Age: 74
End: 2024-03-12
Payer: MEDICARE

## 2024-03-12 DIAGNOSIS — M16.11 UNILATERAL PRIMARY OSTEOARTHRITIS, RIGHT HIP: ICD-10-CM

## 2024-03-12 DIAGNOSIS — M16.11 PRIMARY OSTEOARTHRITIS OF RIGHT HIP: Primary | ICD-10-CM

## 2024-03-12 PROCEDURE — 97110 THERAPEUTIC EXERCISES: CPT | Mod: GP | Performed by: PHYSICAL THERAPIST

## 2024-03-12 ASSESSMENT — PAIN - FUNCTIONAL ASSESSMENT: PAIN_FUNCTIONAL_ASSESSMENT: 0-10

## 2024-03-12 ASSESSMENT — PAIN SCALES - GENERAL: PAINLEVEL_OUTOF10: 0 - NO PAIN

## 2024-03-12 NOTE — PROGRESS NOTES
PHYSICAL THERAPY TREATMENT    Patient name:  William Olsen    MRN:  [id number]    :  1950    Today's Date:  24    Time Calculation  Start Time: 1045  Stop Time: 1124  Time Calculation (min): 39 min     PT Therapeutic Procedures Time Entry  Therapeutic Exercise Time Entry: 38     Referral by:  Referred By: Dr. Nam Patel    Diagnoses:  Diagnosis and Precautions: R hip OA, s/p R ASH 2023 Dr. Nam Patel    Assessment/Plan:  Therapeutic exercise performed in order to improve R hip strength/ROM/mobility.  Patient requires increased tactile/verbal cues with exercise in order to reduce R hip stress/strain during the particular exercise performed.  Patient challenged with exercises performed in clinic secondary to R hip fatigue.   PT Assessment  PT Assessment Results: Decreased strength, Decreased range of motion, Decreased endurance, Impaired balance, Decreased mobility, Orthopedic restrictions  Rehab Prognosis: Good  Evaluation/Treatment Tolerance: Patient tolerated treatment well  OP PT Plan  PT Plan: Skilled PT  Rehab Potential: Good  Plan of Care Agreement: Patient    General Visit Information  PT  Visit  PT Received On: 24    General  Reason for Referral: Evaluate and Treat  Referred By: Dr. Nam Patel  General Comment: R hip OA, s/p R ASH 2023 Dr. Nam Patel    Insurance Reviewed  Name of insurance:  Medicare  Visit #:   6  RT HIP ; MEDICARE PART B 20% COINSUR 240 DED UNLIM V BMN // Isabelle confirmed 24 9:19am  PA IS NOT REQ; AARP IS SEC TO MEDICARE AND WILL COV PART B COINSUR AFTER DED IS MET [id number]     Subjective:  Patient reports that his R hip is feeling pretty good.    Precautions  STEADI Fall Risk Score (The score of 4 or more indicates an increased risk of falling): 0  LE Weight Bearing Status: Weight Bearing as Tolerated  Medical Precautions:  (pacemaker, history of stents, heart surgery, back surgery, R hand surgery, gallbladder surgery, heart disease, HTN,  "prostate cancer and surgery, angina, sleep apnea, L ankle surgery, see attached sheets)  Post-Surgical Precautions: Right hip precautions (posterolateral approach)    Pain:  Pain Assessment  Pain Assessment: 0-10  Pain Score: 0 - No pain  Pain Location: Hip  Pain Orientation: Right    Treatments    Therapeutic Exercise  Therapeutic Exercise Performed: Yes  Therapeutic Exercise Activity 1: Recumbent bike x 8 minues, resistance 4.0  Therapeutic Exercise Activity 2: Standing R hip flexor stretch at steps, 30\"x5  Therapeutic Exercise Activity 3: Standing marching with TB red, 2 sets, 10 reps  Therapeutic Exercise Activity 4: Mini-Squats with TB around knees, blue TB, 3 sets, 10 reps  Therapeutic Exercise Activity 5: Forward Step-Ups 8 inch step, x 30 reps  Therapeutic Exercise Activity 6: Lateral Step-Ups 8 inch step, x 30 reps  Therapeutic Exercise Activity 7: Hip PRE's hip flexion, hip extension, hip abduction x 30 reps each R and L    Treatment  Time in clinic started at:   10:45am  Time in clinic ended at:   11:24am  Total time in clinic is:     39 minutes  Total timed code time is:  38 minutes    Treatment Performed Today:.   Therapeutic Exercise x 3 units  "

## 2024-03-15 ENCOUNTER — APPOINTMENT (OUTPATIENT)
Dept: PHYSICAL THERAPY | Facility: CLINIC | Age: 74
End: 2024-03-15
Payer: MEDICARE

## 2024-03-19 ENCOUNTER — APPOINTMENT (OUTPATIENT)
Dept: PHYSICAL THERAPY | Facility: CLINIC | Age: 74
End: 2024-03-19
Payer: MEDICARE

## 2024-03-21 ENCOUNTER — TREATMENT (OUTPATIENT)
Dept: PHYSICAL THERAPY | Facility: CLINIC | Age: 74
End: 2024-03-21
Payer: MEDICARE

## 2024-03-21 DIAGNOSIS — M16.11 UNILATERAL PRIMARY OSTEOARTHRITIS, RIGHT HIP: ICD-10-CM

## 2024-03-21 DIAGNOSIS — M16.11 PRIMARY OSTEOARTHRITIS OF RIGHT HIP: Primary | ICD-10-CM

## 2024-03-21 PROCEDURE — 97110 THERAPEUTIC EXERCISES: CPT | Mod: GP | Performed by: PHYSICAL THERAPIST

## 2024-03-21 ASSESSMENT — PAIN - FUNCTIONAL ASSESSMENT: PAIN_FUNCTIONAL_ASSESSMENT: 0-10

## 2024-03-21 ASSESSMENT — PAIN SCALES - GENERAL: PAINLEVEL_OUTOF10: 0 - NO PAIN

## 2024-03-21 NOTE — PROGRESS NOTES
PHYSICAL THERAPY TREATMENT    Patient name:  William Olsen    MRN:  [id number]    :  1950    Today's Date:  24    Time Calculation  Start Time: 09  Stop Time: 1020  Time Calculation (min): 24 min     PT Therapeutic Procedures Time Entry  Therapeutic Exercise Time Entry: 23     Referral by:  Referred By: Dr. Nam Patel    Diagnoses:  Diagnosis and Precautions: R hip OA, s/p R ASH 2023 Dr. Nam Patel    Goals:   By the end of 10 visits patient will be able to do the following with < 1/10 R HIP pain:    HEP:  Patient will consistently perform HIS home exercise program for 20-30 minute sessions, 1-2x/day, 3-4 days/week independently by the end of 10 visits.  MET    Basic ADL's:   Patient will perform bADL's/instrumental ADL's for 30 minutes moving between various closed kinetic chain postures.  MET    ROM and Strength:  Patient will demonstrate 5/5 R HIP strength in all planes and WNL's R HIP AROM in all planes to improve their ability to lift, stand, ambulate and perform basic ADL's.  NEARLY MET    Stair Negotiation:  Patient will be able to ambulate up/down stairs for 1-2 flights at a time.  MET    Gait/Locomotion:  Patient will be able to ambulate for 30-60 minutes at a time.  MET  Minimal to no gait deviation across level ground/stairs.  Patient will demonstrate no R HIP pain with the following movements:  partial squat, lunge, forward/lateral step-up, HR, stepdown and SLS.  MET    Sleep:  Patient will sleep thru the night 4/7 nights/week.  MET    Participation restrictions:  Increase LEFS to > or = to 55/80 for increased functional ability. Nearly met    Pain:  Decrease pain at worst to < or = to 1/10 for improved QOL and ability to sleep.  MET    No point tenderness noted over the R hip.  MET    Assessment/Plan:  Patient comes into clinic today for PT Re-Evaluation secondary to being 13 weeks s/p R hip OA, s/p R ASH 2023 Dr. Nam Patel.  Patient demonstrates very good progress  with his R hip rehab thus far, has met 8/10 of his PT goals, is independent with his HEP and will be discharged.  Patient instructed in a home exercise program, has been given handouts for each of the exercises performed and was given another sheet instructing patient in the amount of reps to perform and the dariana to follow while doing the exercises.  PT Assessment  Rehab Prognosis: Good  OP PT Plan  PT Plan: No Additional PT interventions required at this time  PT Frequency:  (discharge)  Duration: discharge  Rehab Potential: Good  Plan of Care Agreement: Patient    General Visit Information  PT  Visit  PT Received On: 03/21/24    General  Reason for Referral: Evaluate and Treat  Referred By: Dr. Nam Patel  General Comment: R hip OA, s/p R ASH 12/20/2023 Dr. Nam Patel    Insurance Reviewed  Name of insurance:  Medicare  Visit #:   7  RT HIP ; MEDICARE PART B 20% COINSUR 240 DED UNLIM V BMN // Isabelle confirmed 2/13/24 9:19am  PA IS NOT REQ; [employer]ARP IS SEC TO MEDICARE AND WILL COV PART B COINSUR AFTER DED IS MET [id number]     Subjective:   Patient comes into clinic today for PT Re-Evaluation secondary to being 13 weeks s/p R hip OA, s/p R ASH 12/20/2023 Dr. Nam Patel.  Patient reports that he follows up with Dr. Patel in June 2024 and that he is having no issues with the R hip performing basic ADL's.    Precautions  STEADI Fall Risk Score (The score of 4 or more indicates an increased risk of falling): 0  LE Weight Bearing Status: Weight Bearing as Tolerated  Medical Precautions:  (pacemaker, history of stents, heart surgery, back surgery, R hand surgery, gallbladder surgery, heart disease, HTN, prostate cancer and surgery, angina, sleep apnea, L ankle surgery, see attached sheets)  Post-Surgical Precautions: Right hip precautions (posterolateral approach)    Pain:  Pain Assessment  Pain Assessment: 0-10  Pain Score: 0 - No pain  Pain Location: Hip  Pain Orientation: Right    Objective:  Restrictions as  per MD's Protocol if surgical:  Evaluate and Treat    Weightbearing Status:  FWB R LE    Any Pre-Cautions:  posterolateral hip pre-cautions    Skin:  R hip surgical incision healed and closed, no active sign of infection    Palpation:   no point tenderness over R hip    Sensation:  Patient denies numbness/tingling of bilateral LOWER extremities.    Gait:  normal gait R LE     Functional Movements:  no R hip pain with squat, forward/lateral step-ups    ROM:  AROM  R hip flexion 90 degrees, ER 25 degrees, abduction minor end range tightness    Strength:    R hip flexion 4+/5, R hip abduction 4+/5, R knee 5/5 all planes    Outcome measure  Lower Extremity Funtional Score (LEFS): 53/80    Treatments    Therapeutic Exercise  Therapeutic Exercise Performed: Yes  Therapeutic Exercise Activity 1: Recumbent bike x 8 minues, resistance 4.0  Therapeutic Exercise Activity 2: See Re-Evaluation    Patient instructed in a home exercise program, has been given handouts for each of the exercises performed and was given another sheet instructing patient in the amount of reps to perform and the dariana to follow while doing the exercises     Access Code: [email or web address]  URL: [email or web address]  Date: 03/21/2024  Prepared by: Laz Moseley    Exercises  - Hip Flexor Stretch with Chair  - 1 x daily - 3-4 x weekly - 1 sets - 5 reps - 30 hold  - Standing March with Counter Support  - 1 x daily - 3-4 x weekly - 2 sets - 10 reps - 5-10 hold  - Mini Squat with Counter Support  - 1 x daily - 3-4 x weekly - 2 sets - 10 reps - 5-10 hold  - Step Up  - 1 x daily - 4-5 x weekly - 2 sets - 10 reps - 5-10 hold  - Lateral Step Up  - 1 x daily - 3-4 x weekly - 2 sets - 10 reps - 5-10 hold  - Standing Hip Flexion with Counter Support  - 1 x daily - 3-4 x weekly - 2 sets - 10 reps - 5-10 hold  - Standing Hip Abduction with Counter Support  - 1 x daily - 3-4 x weekly - 2 sets - 10 reps - 5-10 hold  - Standing Hip Extension with Counter  Support  - 1 x daily - 3-4 x weekly - 2 sets - 10 reps - 5-10 hold    Treatment  Time in clinic started at:   9:56am  Time in clinic ended at:   10:20am  Total time in clinic is:    24 minutes  Total timed code time is:  23 minutes    Treatment Performed Today:.   Therapeutic Exercise x 2 units

## 2024-03-21 NOTE — PATIENT INSTRUCTIONS
Access Code: [email or web address]  URL: [email or web address]  Date: 03/21/2024  Prepared by: Laz Moseley    Exercises  - Hip Flexor Stretch with Chair  - 1 x daily - 3-4 x weekly - 1 sets - 5 reps - 30 hold  - Standing March with Counter Support  - 1 x daily - 3-4 x weekly - 2 sets - 10 reps - 5-10 hold  - Mini Squat with Counter Support  - 1 x daily - 3-4 x weekly - 2 sets - 10 reps - 5-10 hold  - Step Up  - 1 x daily - 4-5 x weekly - 2 sets - 10 reps - 5-10 hold  - Lateral Step Up  - 1 x daily - 3-4 x weekly - 2 sets - 10 reps - 5-10 hold  - Standing Hip Flexion with Counter Support  - 1 x daily - 3-4 x weekly - 2 sets - 10 reps - 5-10 hold  - Standing Hip Abduction with Counter Support  - 1 x daily - 3-4 x weekly - 2 sets - 10 reps - 5-10 hold  - Standing Hip Extension with Counter Support  - 1 x daily - 3-4 x weekly - 2 sets - 10 reps - 5-10 hold

## 2024-03-26 ENCOUNTER — APPOINTMENT (OUTPATIENT)
Dept: PHYSICAL THERAPY | Facility: CLINIC | Age: 74
End: 2024-03-26
Payer: MEDICARE